# Patient Record
(demographics unavailable — no encounter records)

---

## 2022-03-18 NOTE — CAT SCAN REPORT
CT ABDOMEN AND PELVIS WITH CONTRAST



HISTORY: R10.30 LOWER ABDOMINAL PAIN,UNSPECIFIED OMNI 300 100 ML



COMPARISON: Prior CT on 11/10/2021



TECHNIQUE: Routine abdominal and pelvic CT exam performed  following intravenous contrast administrat
ion.. All CT scans at this location are performed using CT dose reduction for ALARA by means of autom
ated exposure control.



FINDINGS:



CT ABDOMEN:

Lung Bases: No significant abnormality.

Liver: No significant abnormality.

Biliary: No significant abnormality.

Spleen: No significant abnormality.  Unenlarged.

Pancreas: No significant abnormality.

Adrenals: No significant abnormality.

Kidneys: No significant abnormality.

Lymphatics: No lymphadenopathy.

Vasculature: No significant abnormality. 

Bowel/Peritoneum: No significant abnormality. No free air. No free fluid. Appendix not visualized. No
 pericecal inflammation.



CT PELVIC:

: The pelvic organs appear grossly normal. There is a small amount of free fluid in the pelvis.

Lymphatics: No lymphadenopathy.



Osseous Structures: No aggressive appearing osseous lesions.

Additional Findings: None



IMPRESSION:

1. Small amount of free fluid in the pelvis, likely physiologic.

2. No additional acute findings.



Signer Name: Lonnie Ty MD 

Signed: 3/18/2022 12:20 PM

Workstation Name: [facility]

## 2022-03-18 NOTE — EMERGENCY DEPARTMENT REPORT
ED General Adult HPI





- General


Chief complaint: Pain General


Stated complaint: SIDE PAIN STOMACH PAIN


Time Seen by Provider: 03/18/22 14:32


Source: patient


Mode of arrival: Ambulatory


Limitations: No Limitations





- History of Present Illness


Initial comments: 





pt presents to ed with possible bowel obstruction , pt had history of that , has

been taking tramadol for pain , no nausea or vomiting o fever nor taya no chets 

pain 


-: Gradual


Severity scale (0 -10): 2


Quality: aching


Consistency: intermittent


Improves with: none





- Related Data


                                Home Medications











 Medication  Instructions  Recorded  Confirmed  Last Taken


 


clonazePAM [Klonopin] 1 mg PO Q6HR 11/10/21 11/10/21 11/08/21 09:00





    1 mg








                                  Previous Rx's











 Medication  Instructions  Recorded  Last Taken  Type


 


Albuterol Sulfate [Ventolin HFA] 2 puff IH Q4H PRN #1 hfa.aer.ad 07/13/16 11/09/21 09:00 Rx


 


traMADoL [Ultram 50 MG tab] 50 mg PO Q6HR PRN #20 tablet 07/13/16 11/08/21 09:00

Rx





   50 mg 


 


clonazePAM [KlonoPIN] 1 mg PO QHS  tablet 11/15/21 Unknown Rx


 


levoFLOXacin [Levaquin] 750 mg PO QDAY 4 Days #4 tablet 11/15/21 Unknown Rx


 


metroNIDAZOLE [Flagyl TAB] 500 mg PO Q8HR 5 Days #15 tablet 11/15/21 Unknown Rx


 


ondansetron HCL [Zofran] 4 mg PO Q8H 5 Days #15 tablet 11/15/21 Unknown Rx


 


oxyCODONE /ACETAMINOPHEN [Percocet 1 tab PO Q4H PRN 2 Days #12 tablet 11/15/21 

Unknown Rx





5/325 mg]    











                                    Allergies











Allergy/AdvReac Type Severity Reaction Status Date / Time


 


peanut Allergy Mild Unknown Verified 11/12/21 03:39


 


cefdinir [From Omnicef] Allergy  Swelling Verified 11/12/21 03:39


 


cyclobenzaprine Allergy  Swelling Verified 11/12/21 03:39





[From Flexeril]     


 


acetaminophen [From Tylenol] AdvReac  Unknown Verified 11/12/21 03:39


 


ibuprofen [From Motrin] AdvReac  Unknown Verified 11/12/21 03:39














ED Review of Systems


ROS: 


Stated complaint: SIDE PAIN STOMACH PAIN


Other details as noted in HPI





Constitutional: denies: chills, fever


Eyes: denies: eye pain, eye discharge, vision change


ENT: denies: ear pain, throat pain


Respiratory: denies: cough, shortness of breath, wheezing


Cardiovascular: denies: chest pain, palpitations


Endocrine: no symptoms reported


Gastrointestinal: denies: abdominal pain, nausea, diarrhea


Genitourinary: denies: urgency, dysuria, discharge


Musculoskeletal: denies: back pain, joint swelling, arthralgia


Skin: denies: rash, lesions


Neurological: denies: headache, weakness, paresthesias


Psychiatric: denies: anxiety, depression


Hematological/Lymphatic: denies: easy bleeding, easy bruising





ED Past Medical Hx





- Past Medical History


Hx Hypertension: No


Hx Congestive Heart Failure: No


Hx Diabetes: No


Hx Headaches / Migraines: Yes


Hx Seizures: Yes (pt was told this was possible cause of syncope )


Hx Asthma: Yes (post covid 03/2020 - uses inhaler occasionally)


Hx COPD: No


Additional medical history: Recurrent syncope, ovarian cyst





- Social History


Smoking Status: Never Smoker





- Medications


Home Medications: 


                                Home Medications











 Medication  Instructions  Recorded  Confirmed  Last Taken  Type


 


Albuterol Sulfate [Ventolin HFA] 2 puff IH Q4H PRN #1 hfa.aer.ad 07/13/16 

11/10/21 11/09/21 09:00 Rx


 


traMADoL [Ultram 50 MG tab] 50 mg PO Q6HR PRN #20 tablet 07/13/16 11/10/21 

11/08/21 09:00 Rx





    50 mg 


 


clonazePAM [Klonopin] 1 mg PO Q6HR 11/10/21 11/10/21 11/08/21 09:00 History





    1 mg 


 


clonazePAM [KlonoPIN] 1 mg PO QHS  tablet 11/15/21  Unknown Rx


 


levoFLOXacin [Levaquin] 750 mg PO QDAY 4 Days #4 tablet 11/15/21  Unknown Rx


 


metroNIDAZOLE [Flagyl TAB] 500 mg PO Q8HR 5 Days #15 tablet 11/15/21  Unknown Rx


 


ondansetron HCL [Zofran] 4 mg PO Q8H 5 Days #15 tablet 11/15/21  Unknown Rx


 


oxyCODONE /ACETAMINOPHEN [Percocet 1 tab PO Q4H PRN 2 Days #12 tablet 11/15/21  

Unknown Rx





5/325 mg]     














ED Physical Exam





- General


Limitations: No Limitations


General appearance: alert, in no apparent distress





- Head


Head exam: Present: atraumatic, normocephalic





- Eye


Eye exam: Present: normal appearance





- ENT


ENT exam: Present: mucous membranes moist





- Neck


Neck exam: Present: normal inspection





- Respiratory


Respiratory exam: Present: normal lung sounds bilaterally.  Absent: respiratory 

distress





- Cardiovascular


Cardiovascular Exam: Present: regular rate, normal rhythm.  Absent: systolic 

murmur, diastolic murmur, rubs, gallop





- GI/Abdominal


GI/Abdominal exam: Present: soft, normal bowel sounds





- Extremities Exam


Extremities exam: Present: normal inspection





- Back Exam


Back exam: Present: normal inspection





- Neurological Exam


Neurological exam: Present: alert, oriented X3





- Psychiatric


Psychiatric exam: Present: normal affect, normal mood





- Skin


Skin exam: Present: warm, dry, intact, normal color.  Absent: rash





ED Course





                                   Vital Signs











  03/18/22 03/18/22





  12:57 14:08


 


Temperature  97.4 F L


 


Pulse Rate 84 


 


Respiratory 16 





Rate  


 


Blood Pressure 133/97 





[Left]  


 


O2 Sat by Pulse 100 





Oximetry  














ED Medical Decision Making





- Lab Data


Result diagrams: 


                                 03/18/22 15:57





                                 03/18/22 15:57





- Medical Decision Making





work up negative , CT scan negative for SBO or abscess, received a call from 

Surgeon about her and discussed with me her finding and she needs to see 

somebody for her pain either obgyn or GI but o surgical reason for pain , pt 

requesting pain meds repeatedly , stomach looks benign on exam , pt is allergic 

to NSAID and tylenol but not for narcotics, denied her request asked fro tylenol

 as she said she can tolerate it 


Critical care attestation.: 


If time is entered above; I have spent that time in minutes in the direct care 

of this critically ill patient, excluding procedure time.








ED Disposition


Clinical Impression: 


 Abdominal pain





Disposition: 01 HOME / SELF CARE / HOMELESS


Is pt being admited?: No


Does the pt Need Aspirin: No


Condition: Stable


Instructions:  Abdominal Pain, Adult, Easy-to-Read


Referrals: 


YURI MCPHERSON JR, MD [Primary Care Provider] - 3-5 Days


PABLO SCOTT MD [Staff Physician] - 3-5 Days